# Patient Record
Sex: MALE | Race: WHITE | Employment: OTHER | URBAN - METROPOLITAN AREA
[De-identification: names, ages, dates, MRNs, and addresses within clinical notes are randomized per-mention and may not be internally consistent; named-entity substitution may affect disease eponyms.]

---

## 2022-08-29 RX ORDER — ACETAMINOPHEN 500 MG
500 TABLET ORAL
COMMUNITY

## 2022-08-29 RX ORDER — EZETIMIBE 10 MG/1
10 TABLET ORAL DAILY
COMMUNITY

## 2022-08-29 RX ORDER — GLIMEPIRIDE 1 MG/1
1 TABLET ORAL DAILY
COMMUNITY

## 2022-08-29 RX ORDER — PRAVASTATIN SODIUM 10 MG/1
10 TABLET ORAL
COMMUNITY

## 2022-08-30 ENCOUNTER — HOSPITAL ENCOUNTER (OUTPATIENT)
Age: 77
Setting detail: OUTPATIENT SURGERY
Discharge: HOME | End: 2022-08-30
Attending: SPECIALIST | Admitting: SPECIALIST
Payer: MEDICARE

## 2022-08-30 ENCOUNTER — ANESTHESIA EVENT (OUTPATIENT)
Dept: ENDOSCOPY | Age: 77
End: 2022-08-30
Payer: MEDICARE

## 2022-08-30 ENCOUNTER — ANESTHESIA (OUTPATIENT)
Dept: ENDOSCOPY | Age: 77
End: 2022-08-30
Payer: MEDICARE

## 2022-08-30 VITALS
BODY MASS INDEX: 29.72 KG/M2 | DIASTOLIC BLOOD PRESSURE: 71 MMHG | TEMPERATURE: 97.7 F | HEART RATE: 94 BPM | SYSTOLIC BLOOD PRESSURE: 104 MMHG | HEIGHT: 72 IN | OXYGEN SATURATION: 96 % | WEIGHT: 219.4 LBS | RESPIRATION RATE: 16 BRPM

## 2022-08-30 PROCEDURE — 74011250636 HC RX REV CODE- 250/636: Performed by: SPECIALIST

## 2022-08-30 PROCEDURE — 74011000250 HC RX REV CODE- 250: Performed by: ANESTHESIOLOGY

## 2022-08-30 PROCEDURE — 76040000019: Performed by: SPECIALIST

## 2022-08-30 PROCEDURE — 74011250636 HC RX REV CODE- 250/636: Performed by: ANESTHESIOLOGY

## 2022-08-30 PROCEDURE — 77030013992 HC SNR POLYP ENDOSC BSC -B: Performed by: SPECIALIST

## 2022-08-30 PROCEDURE — 76060000031 HC ANESTHESIA FIRST 0.5 HR: Performed by: SPECIALIST

## 2022-08-30 PROCEDURE — 2709999900 HC NON-CHARGEABLE SUPPLY: Performed by: SPECIALIST

## 2022-08-30 PROCEDURE — 88305 TISSUE EXAM BY PATHOLOGIST: CPT

## 2022-08-30 RX ORDER — SODIUM CHLORIDE 9 MG/ML
50 INJECTION, SOLUTION INTRAVENOUS CONTINUOUS
Status: DISCONTINUED | OUTPATIENT
Start: 2022-08-30 | End: 2022-08-30 | Stop reason: HOSPADM

## 2022-08-30 RX ORDER — LIDOCAINE HYDROCHLORIDE 20 MG/ML
INJECTION, SOLUTION EPIDURAL; INFILTRATION; INTRACAUDAL; PERINEURAL AS NEEDED
Status: DISCONTINUED | OUTPATIENT
Start: 2022-08-30 | End: 2022-08-30 | Stop reason: HOSPADM

## 2022-08-30 RX ORDER — DEXTROMETHORPHAN/PSEUDOEPHED 2.5-7.5/.8
1.2 DROPS ORAL
Status: DISCONTINUED | OUTPATIENT
Start: 2022-08-30 | End: 2022-08-30 | Stop reason: HOSPADM

## 2022-08-30 RX ORDER — SODIUM CHLORIDE 0.9 % (FLUSH) 0.9 %
5-40 SYRINGE (ML) INJECTION EVERY 8 HOURS
Status: DISCONTINUED | OUTPATIENT
Start: 2022-08-30 | End: 2022-08-30 | Stop reason: HOSPADM

## 2022-08-30 RX ORDER — PROPOFOL 10 MG/ML
INJECTION, EMULSION INTRAVENOUS AS NEEDED
Status: DISCONTINUED | OUTPATIENT
Start: 2022-08-30 | End: 2022-08-30 | Stop reason: HOSPADM

## 2022-08-30 RX ORDER — SODIUM CHLORIDE 0.9 % (FLUSH) 0.9 %
5-40 SYRINGE (ML) INJECTION AS NEEDED
Status: DISCONTINUED | OUTPATIENT
Start: 2022-08-30 | End: 2022-08-30 | Stop reason: HOSPADM

## 2022-08-30 RX ADMIN — SODIUM CHLORIDE 50 ML/HR: 9 INJECTION, SOLUTION INTRAVENOUS at 08:44

## 2022-08-30 RX ADMIN — LIDOCAINE HYDROCHLORIDE 40 MG: 20 INJECTION, SOLUTION EPIDURAL; INFILTRATION; INTRACAUDAL; PERINEURAL at 08:56

## 2022-08-30 RX ADMIN — PROPOFOL 130 MG: 10 INJECTION, EMULSION INTRAVENOUS at 09:13

## 2022-08-30 NOTE — ANESTHESIA POSTPROCEDURE EVALUATION
Procedure(s):  COLONOSCOPY  ENDOSCOPIC POLYPECTOMY. total IV anesthesia    Anesthesia Post Evaluation        Patient location during evaluation: PACU  Note status: Adequate. Level of consciousness: responsive to verbal stimuli and sleepy but conscious  Pain management: satisfactory to patient  Airway patency: patent  Anesthetic complications: no  Cardiovascular status: acceptable  Respiratory status: acceptable  Hydration status: acceptable  Comments: +Post-Anesthesia Evaluation and Assessment    Patient: Darrick Wright MRN: [id number]  SSN: [id number]   YOB: 1945  Age: 77 y.o. Sex: male      Cardiovascular Function/Vital Signs    /71   Pulse 94   Temp 36.5 °C (97.7 °F)   Resp 16   Ht 6' (1.829 m)   Wt 99.5 kg (219 lb 6.4 oz)   SpO2 96%   BMI 29.76 kg/m²     Patient is status post Procedure(s):  COLONOSCOPY  ENDOSCOPIC POLYPECTOMY. Nausea/Vomiting: Controlled. Postoperative hydration reviewed and adequate. Pain:  Pain Scale 1: Numeric (0 - 10) (08/30/22 0935)  Pain Intensity 1: 0 (08/30/22 0935)   Managed. Neurological Status: At baseline. Mental Status and Level of Consciousness: Arousable. Pulmonary Status:   O2 Device: None (Room air) (08/30/22 0935)   Adequate oxygenation and airway patent. Complications related to anesthesia: None    Post-anesthesia assessment completed. No concerns. Signed By: Jody Barreto MD    8/30/2022  Post anesthesia nausea and vomiting:  controlled      INITIAL Post-op Vital signs:   Vitals Value Taken Time   /78 08/30/22 0939   Temp 36.5 °C (97.7 °F) 08/30/22 0924   Pulse 88 08/30/22 0940   Resp 13 08/30/22 0940   SpO2 98 % 08/30/22 0939   Vitals shown include unvalidated device data.

## 2022-08-30 NOTE — DISCHARGE INSTRUCTIONS
Terry Miranda  [id number]  1945    COLON DISCHARGE INSTRUCTIONS  Discomfort:  Redness at IV site- apply warm compress to area; if redness or soreness persist- contact your physician  There may be a slight amount of blood passed from the rectum  Gaseous discomfort- walking, belching will help relieve any discomfort  You may not operate a vehicle for 12 hours  You may not engage in an occupation involving machinery or appliances for rest of today  You may not drink alcoholic beverages for at least 12 hours  Avoid making any critical decisions for at least 24 hour  DIET:   Regular diet. - however -  remember your colon is empty and a heavy meal will produce gas. Avoid these foods:  vegetables, fried / greasy foods, carbonated drinks for today. MEDICATIONS:        Regarding Aspirin or Nonsteroidal medications, please see below. ACTIVITY:  You may resume your normal daily activities it is recommended that you spend the remainder of the day resting -  avoid any strenuous activity. CALL M.D. ANY SIGN OF:  Increasing pain, nausea, vomiting  Abdominal distension (swelling)  New increased bleeding (oral or rectal)  Fever (chills)  Pain in chest area  Bloody discharge from nose or mouth  Shortness of breath  Tylenol as needed for pain. Follow-up Instructions:   Call Dr. Justin Victoria for results of procedure / biopsy in 4-5 days at telephone #  [phone]              Repeat Colonoscopy timing depends on results of the polyp results.

## 2022-08-30 NOTE — PERIOP NOTES
Endoscopy Case End Note:    0788:  Procedure scope was pre-cleaned, per protocol, at bedside by Kahlil Chakraborty. 3236:  Report received from anesthesia - Dr. Brenda Tellez. See anesthesia flowsheet for intra-procedure vital signs and events. 1769:  Glasses returned to patient.

## 2022-08-30 NOTE — H&P
Gastroenterology Outpatient History and Physical    Patient: Mir Mckeon    Physician: Brad Shaw MD    Vital Signs: Blood pressure (!) 146/75, pulse 92, resp. rate 15, height 6' (1.829 m), weight 99.5 kg (219 lb 6.4 oz), SpO2 96 %. Allergies: No Known Allergies    Chief Complaint: CRC screening    History of Present Illness: above    Justification for Procedure: above    History:  Past Medical History:   Diagnosis Date    Arthritis     Cancer ([address]     skin cancer on scalp, ears & nose    Cataract     Chronic pain     low back    Diabetes (HCC)     Diverticulosis     GERD (gastroesophageal reflux disease)     High cholesterol     PUD (peptic ulcer disease)     RBBB (right bundle branch block) 04/13/2011    Sleep apnea     uses CPAP      Past Surgical History:   Procedure Laterality Date    COLONOSCOPY N/A 09/21/2016    COLONOSCOPY performed by Brad Shaw MD at [facility] ENDOSCOPY    HX CATARACT REMOVAL Bilateral     HX KNEE ARTHROSCOPY Left 01/01/2003    HX KNEE ARTHROSCOPY Left 01/01/2009    ACL repair    HX LUMBAR FUSION  1990~    HX ORTHOPAEDIC Right 01/01/1998    tennis elbow repair    HX PARTIAL COLECTOMY  04/2011    5\" of sigmoid      Social History     Socioeconomic History    Marital status:    Tobacco Use    Smoking status: Former     Types: Pipe    Smokeless tobacco: Never   Substance and Sexual Activity    Alcohol use: Not Currently    Drug use: No      Family History   Problem Relation Age of Onset    Heart Failure Mother     Stroke Father     Diabetes Sister     Glaucoma Sister     Alzheimer's Disease Brother        Medications:   Prior to Admission medications    Medication Sig Start Date End Date Taking? Authorizing Provider   acetaminophen (TYLENOL) 500 mg tablet Take 500 mg by mouth every six (6) hours as needed for Pain. Yes Provider, Historical   METHYLCELLULOSE, LAXATIVE, PO Take  by mouth daily.    Yes Provider, Historical   pravastatin (PRAVACHOL) 10 mg tablet Take 10 mg by mouth nightly. Yes Provider, Historical   glimepiride (AMARYL) 1 mg tablet Take 1 mg by mouth daily. Yes Provider, Historical   ezetimibe (ZETIA) 10 mg tablet Take 10 mg by mouth daily. Yes Provider, Historical   Lactobacillus acidophilus (PROBIOTIC PO) Take  by mouth daily. Yes Provider, Historical   metFORMIN (GLUCOPHAGE) 500 mg tablet Take 500 mg by mouth two (2) times daily (with meals). Yes Provider, Historical   loratadine (CLARITIN) 10 mg tablet Take 10 mg by mouth. Yes Provider, Historical   montelukast (SINGULAIR) 10 mg tablet Take 10 mg by mouth nightly. Yes Provider, Historical   omeprazole (PRILOSEC) 20 mg capsule Take 20 mg by mouth daily. Yes Provider, Historical   fenofibrate (TRICOR) 160 mg tablet Take 160 mg by mouth daily. Indications: HYPERTRIGLYCERIDEMIA   Yes Provider, Historical   multivitamin (ONE A DAY) tablet Take 1 Tablet by mouth daily. Yes Provider, Historical   cholecalciferol (VITAMIN D3) 25 mcg (1,000 unit) cap Take 1 Cap by mouth daily. Yes Provider, Historical   co-enzyme Q-10 (CO Q-10) 100 mg capsule Take 100 mg by mouth daily. Yes Provider, Historical   glutamine 500 mg tab Take 1 Tab by mouth daily. Yes Provider, Historical   beta carotene 25,000 unit capsule Take 25,000 Units by mouth every other day. Yes Provider, Historical   aspirin 81 mg tablet Take 81 mg by mouth daily. 4/13/11  Yes Provider, Historical   azelastine (OPTIVAR) 0.05 % ophthalmic solution Administer 1 Drop to both eyes as needed. Use in affected eye(s)    Provider, Historical   lutein 20 mg Cap Take 1 Cap by mouth daily. Patient not taking: Reported on 8/30/2022    Provider, Historical       Physical Exam:   General: alert, no distress   HEENT: Head: Normocephalic, no lesions, without obvious abnormality.    Heart: regular rate and rhythm, S1, S2 normal, no murmur, click, rub or gallop   Lungs: chest clear, no wheezing, rales, normal symmetric air entry   Abdominal: soft, NT/ND+ BS   Neurological: Grossly normal   Extremities: extremities normal, atraumatic, no cyanosis or edema     Findings/Diagnosis: CRC screening    Plan of Care/Planned Procedure: Colonoscopy

## 2022-08-30 NOTE — ANESTHESIA PREPROCEDURE EVALUATION
Anesthetic History   No history of anesthetic complications            Review of Systems / Medical History  Patient summary reviewed, nursing notes reviewed and pertinent labs reviewed    Pulmonary        Sleep apnea: CPAP           Neuro/Psych   Within defined limits           Cardiovascular            Dysrhythmias       Exercise tolerance: >4 METS  Comments: RBBB   GI/Hepatic/Renal     GERD: well controlled      PUD    Comments: H/O colon polyps  H/O diverticulosis s/p sigmoid colectomy Endo/Other    Diabetes    Arthritis    Comments: H/O SCC scalp s/p excision Other Findings              Physical Exam    Airway  Mallampati: I  TM Distance: > 6 cm  Neck ROM: normal range of motion   Mouth opening: Normal     Cardiovascular  Regular rate and rhythm,  S1 and S2 normal,  no murmur, click, rub, or gallop             Dental  No notable dental hx       Pulmonary  Breath sounds clear to auscultation               Abdominal  GI exam deferred       Other Findings            Anesthetic Plan    ASA: 3  Anesthesia type: general and total IV anesthesia          Induction: Intravenous  Anesthetic plan and risks discussed with: Patient      Propofol MAC

## 2022-08-30 NOTE — PROCEDURES
Colonoscopy Procedure Note    Indications:   Screening colonoscopy    Referring Physician: Siddharth Bardales MD  Anesthesia/Sedation: MAC anesthesia Propofol  Endoscopist:  Dr. Josee Arrieta    Procedure in Detail:  Informed consent was obtained for the procedure, including sedation. Risks of perforation, hemorrhage, adverse drug reaction, and aspiration were discussed. The patient was placed in the left lateral decubitus position. Based on the pre-procedure assessment, including review of the patient's medical history, medications, allergies, and review of systems, he had been deemed to be an appropriate candidate for moderate sedation; he was therefore sedated with the medications listed above. The patient was monitored continuously with ECG tracing, pulse oximetry, blood pressure monitoring, and direct observations. A rectal examination was performed. The [id number] was inserted into the rectum and advanced under direct vision to the terminal ileum. The quality of the colonic preparation was adequate. A careful inspection was made as the colonoscope was withdrawn, including a retroflexed view of the rectum; findings and interventions are described below. Appropriate photodocumentation was obtained. Findings:    Scope advanced to the cecum and terminal ileum. Preparation was adequate. There was a sessile 5 mm polyp in the transverse colon s/p cold snare removal   Normal anastomosis in sigmoid colon. Therapies:  see above    Specimen: Specimens were collected as described above and sent to pathology. Complications: None were encountered during the procedure. EBL: < 10 ml. Recommendations:   -Repeat colonoscopy interval to be determined based on pathology results.     Signed By: Ximena Pennington MD                        August 30, 2022

## 2022-08-30 NOTE — PROGRESS NOTES
Patient Education on Sedation / Analgesia Administered for Procedure      For 24 hours after general anesthesia or intravenous analgesia / sedation:  Have someone responsible help you with your care  Limit your activities  Do not drive and operate hazardous machinery  Do not make important personal, legal or business decisions  Do not drink alcoholic beverages  If you have not urinated within 8 hours after discharge, please contact your physician  Resume your medications unless otherwise instructed    For 24 hours after general anesthesia or intravenous analgesia / sedation  you may experience:  Drowsiness, dizziness, sleepiness, or confusion  Difficulty remembering or delayed reaction times  Difficulty with your balance, especially while walking, move slowly and carefully, do not make sudden position changes  Difficulty focusing or blurred vision    You may not be aware of slight changes in your behavior and/or your reaction time because of the medication used during and after your procedure.     Report the following to your physician:  Excessive pain, swelling, redness or odor of or around the surgical area  Temperature over 100.5  Nausea and vomiting lasting longer than 4 hours or if unable to take medications  Any signs of decreased circulation or nerve impairment to extremity: change in color, persistent numbness, tingling, coldness or increase pain  Any questions or concerns    IF YOU REPORT TO AN EMERGENCY ROOM, DOCTOR'S OFFICE OR HOSPITAL WITHIN 24 HOURS AFTER YOUR PROCEDURE, BRING THIS SHEET AND YOUR AFTER VISIT SUMMARY WITH YOU AND GIVE IT TO THE PHYSICIAN OR NURSE ATTENDING YOU.

## 2022-08-30 NOTE — ROUTINE PROCESS
Donta Bowels  1945  [id number]    Situation:  Verbal report received from: Jc Wade  Procedure: Procedure(s):  COLONOSCOPY  ENDOSCOPIC POLYPECTOMY    Background:    Preoperative diagnosis: SCREENING  Postoperative diagnosis: Colon Polyp, Normal Anastomosis    :  Dr. Ramachandran Spine  Assistant(s): Endoscopy Technician-1: Ela Moyer  Endoscopy RN-1: Kaleb Cárdenas RN    Specimens:   ID Type Source Tests Collected by Time Destination   1 : Transverse Colon Polyp Preservative Colon, Transverse  Meghan Myles MD 8/30/2022 8745 Pathology     H. Pylori  no    Assessment:  Intra-procedure medications   Anesthesia gave intra-procedure sedation and medications, see anesthesia flow sheet yes    Intravenous fluids: NS@ KVO     Vital signs stable     Abdominal assessment: round and soft     Recommendation:  Discharge patient per MD order.   Family or Friend   Permission to share finding with family or friend yes

## 2022-12-20 ENCOUNTER — APPOINTMENT (OUTPATIENT)
Dept: ULTRASOUND IMAGING | Age: 77
End: 2022-12-20
Attending: EMERGENCY MEDICINE
Payer: MEDICARE

## 2022-12-20 ENCOUNTER — HOSPITAL ENCOUNTER (EMERGENCY)
Age: 77
Discharge: HOME | End: 2022-12-20
Attending: EMERGENCY MEDICINE
Payer: MEDICARE

## 2022-12-20 VITALS
DIASTOLIC BLOOD PRESSURE: 78 MMHG | TEMPERATURE: 98.2 F | WEIGHT: 229.94 LBS | RESPIRATION RATE: 14 BRPM | HEIGHT: 73 IN | BODY MASS INDEX: 30.47 KG/M2 | OXYGEN SATURATION: 98 % | HEART RATE: 91 BPM | SYSTOLIC BLOOD PRESSURE: 155 MMHG

## 2022-12-20 DIAGNOSIS — S86.112A GASTROCNEMIUS MUSCLE STRAIN, LEFT, INITIAL ENCOUNTER: Primary | ICD-10-CM

## 2022-12-20 PROCEDURE — 93971 EXTREMITY STUDY: CPT

## 2022-12-20 PROCEDURE — 99284 EMERGENCY DEPT VISIT MOD MDM: CPT

## 2022-12-20 RX ORDER — NAPROXEN 500 MG/1
500 TABLET ORAL 2 TIMES DAILY WITH MEALS
Qty: 20 TABLET | Refills: 0 | Status: SHIPPED | OUTPATIENT
Start: 2022-12-20 | End: 2022-12-30

## 2022-12-20 NOTE — ED PROVIDER NOTES
EMERGENCY DEPARTMENT HISTORY AND PHYSICAL EXAM      Date: 12/20/2022  Patient Name: Maycol Shaw    History of Presenting Illness     Chief Complaint   Patient presents with    Leg Swelling     Left lower leg pain swelling onset last Thursday, started out as a grace horse . Having some swelling on inner left calf. Not on a blood thinner except 81 mg aspirin       History Provided By: Patient    HPI: Maycol Shaw, 69 y.o. male with a past medical history significant for medical problems as stated below presents to the ED with cc of presenting with persistent, atraumatic left lower leg pain and swelling over the last 1 week. Pain is isolated in the superior aspect of the posterior calf. It is it is slightly worse with walking and better at rest.  Patient called primary care doctor who told him to come to the ER to rule out a blood clot. There is no chest pain, trouble breathing, or any systemic symptoms. There are no associated symptoms. No other exacerbating or ameliorating factors. PCP: Mc Tineo MD    No current facility-administered medications on file prior to encounter. Current Outpatient Medications on File Prior to Encounter   Medication Sig Dispense Refill    acetaminophen (TYLENOL) 500 mg tablet Take 500 mg by mouth every six (6) hours as needed for Pain. METHYLCELLULOSE, LAXATIVE, PO Take  by mouth daily. pravastatin (PRAVACHOL) 10 mg tablet Take 10 mg by mouth nightly. glimepiride (AMARYL) 1 mg tablet Take 1 mg by mouth daily. ezetimibe (ZETIA) 10 mg tablet Take 10 mg by mouth daily. Lactobacillus acidophilus (PROBIOTIC PO) Take  by mouth daily. metFORMIN (GLUCOPHAGE) 500 mg tablet Take 500 mg by mouth two (2) times daily (with meals). azelastine (OPTIVAR) 0.05 % ophthalmic solution Administer 1 Drop to both eyes as needed.  Use in affected eye(s)      loratadine (CLARITIN) 10 mg tablet Take 10 mg by mouth.      montelukast (SINGULAIR) 10 mg tablet Take 10 mg by mouth nightly. omeprazole (PRILOSEC) 20 mg capsule Take 20 mg by mouth daily. fenofibrate (TRICOR) 160 mg tablet Take 160 mg by mouth daily. Indications: HYPERTRIGLYCERIDEMIA      multivitamin (ONE A DAY) tablet Take 1 Tablet by mouth daily. cholecalciferol (VITAMIN D3) 25 mcg (1,000 unit) cap Take 1 Cap by mouth daily. co-enzyme Q-10 (CO Q-10) 100 mg capsule Take 100 mg by mouth daily. glutamine 500 mg tab Take 1 Tab by mouth daily. lutein 20 mg Cap Take 1 Cap by mouth daily. (Patient not taking: Reported on 8/30/2022)      beta carotene 25,000 unit capsule Take 25,000 Units by mouth every other day. aspirin 81 mg tablet Take 81 mg by mouth daily.          Past History     Past Medical History:  Past Medical History:   Diagnosis Date    Arthritis     Cancer ([facility] [address]     skin cancer on scalp, ears & nose    Cataract     Chronic pain     low back    Diabetes (HCC)     Diverticulosis     GERD (gastroesophageal reflux disease)     High cholesterol     PUD (peptic ulcer disease)     RBBB (right bundle branch block) 04/13/2011    Sleep apnea     uses CPAP       Past Surgical History:  Past Surgical History:   Procedure Laterality Date    COLONOSCOPY N/A 09/21/2016    COLONOSCOPY performed by Jere Harris MD at [facility] ENDOSCOPY    COLONOSCOPY N/A 8/30/2022    COLONOSCOPY performed by Marva Grace MD at [facility] ENDOSCOPY    HX CATARACT REMOVAL Bilateral     HX KNEE ARTHROSCOPY Left 01/01/2003    HX KNEE ARTHROSCOPY Left 01/01/2009    ACL repair    HX LUMBAR FUSION  1990~    HX ORTHOPAEDIC Right 01/01/1998    tennis elbow repair    HX PARTIAL COLECTOMY  04/2011    5\" of sigmoid       Family History:  Family History   Problem Relation Age of Onset    Heart Failure Mother     Stroke Father     Diabetes Sister     Glaucoma Sister     Alzheimer's Disease Brother        Social History:  Social History     Tobacco Use    Smoking status: Former     Types: Pipe    Smokeless tobacco: Never   Substance Use Topics    Alcohol use: Not Currently    Drug use: No       Allergies:  No Known Allergies      Review of Systems   Review of Systems   Constitutional:  Negative for chills, diaphoresis, fatigue and fever. HENT:  Negative for ear pain and sore throat. Eyes:  Negative for pain and redness. Respiratory:  Negative for cough and shortness of breath. Cardiovascular:  Negative for chest pain and leg swelling. Gastrointestinal:  Negative for abdominal pain, diarrhea, nausea and vomiting. Endocrine: Negative for cold intolerance and heat intolerance. Genitourinary:  Negative for flank pain and hematuria. Musculoskeletal:  Positive for myalgias. Negative for back pain and neck stiffness. Skin:  Negative for rash and wound. Neurological:  Negative for dizziness, syncope and headaches. All other systems reviewed and are negative. Physical Exam   Physical Exam  Vitals and nursing note reviewed. Constitutional:       General: He is not in acute distress. Appearance: He is well-developed. He is not ill-appearing. HENT:      Head: Normocephalic and atraumatic. Mouth/Throat:      Pharynx: No oropharyngeal exudate. Eyes:      Conjunctiva/sclera: Conjunctivae normal.      Pupils: Pupils are equal, round, and reactive to light. Cardiovascular:      Rate and Rhythm: Normal rate and regular rhythm. Heart sounds: No murmur heard. Pulmonary:      Effort: Pulmonary effort is normal. No respiratory distress. Breath sounds: Normal breath sounds. No wheezing. Abdominal:      General: Bowel sounds are normal. There is no distension. Palpations: Abdomen is soft. Tenderness: There is no abdominal tenderness. Musculoskeletal:         General: No deformity. Normal range of motion. Cervical back: Normal range of motion. Legs:       Comments: Mild tenderness to palpation in the proximal medial calf.   There is trace pitting edema to the left lower extremity. Good distal DP and TP pulses, good cap refill, normal sensation and motor function throughout the left lower extremity. Compartments are soft. Skin:     General: Skin is warm and dry. Findings: No rash. Neurological:      Mental Status: He is alert and oriented to person, place, and time. Cranial Nerves: No cranial nerve deficit. Sensory: No sensory deficit. Motor: No weakness. Coordination: Coordination normal.      Gait: Gait normal.   Psychiatric:         Behavior: Behavior normal.       Diagnostic Study Results     Labs -   No results found for this or any previous visit (from the past 24 hour(s)). Radiologic Studies -   DUPLEX LOWER EXT VENOUS LEFT   Final Result        CT Results  (Last 48 hours)      None          CXR Results  (Last 48 hours)      None              Medical Decision Making   I am the first provider for this patient. I reviewed the vital signs, available nursing notes, past medical history, past surgical history, family history and social history. Vital Signs-Reviewed the patient's vital signs. Patient Vitals for the past 12 hrs:   Temp Pulse Resp BP SpO2   12/20/22 0948 98.2 °F (36.8 °C) 91 14 (!) 155/78 98 %       Records Reviewed: Nursing records and medical records reviewed    MDM:  DVT versus deep space infection of the leg versus muscle tear versus muscle strain versus acute arterial occlusion    Provider Notes (Medical Decision Making):   Patient is a 78-year-old male presenting with pain and swelling to the left calf that is atraumatic. Considered DVT but normal duplex ultrasound. Considered deep space infection but no fever, systemic symptoms, or any erythema or redness. Based on ultrasound results suspect muscle injury, strain. Patient does regular exercises with the lower extremity for his arthritis that may have caused this. Port of care and return precautions. ED Course:   Initial assessment performed.  The patients presenting problems have been discussed, and they are in agreement with the care plan formulated and outlined with them. I have encouraged them to ask questions as they arise throughout their visit. Critical Care:  None      Disposition:  11:03 AM  Tanmay Conway  results have been reviewed with him. He has been counseled regarding his diagnosis. He verbally conveys understanding and agreement of the signs, symptoms, diagnosis, treatment and prognosis and additionally agrees to follow up as recommended with Dr. La Silva MD in 24 - 48 hours. He also agrees with the care-plan and conveys that all of his questions have been answered. I have also put together some discharge instructions for him that include: 1) educational information regarding their diagnosis, 2) how to care for their diagnosis at home, as well a 3) list of reasons why they would want to return to the ED prior to their follow-up appointment, should their condition change. DISCHARGE PLAN:  1. Current Discharge Medication List        START taking these medications    Details   naproxen (Naprosyn) 500 mg tablet Take 1 Tablet by mouth two (2) times daily (with meals) for 10 days. Qty: 20 Tablet, Refills: 0  Start date: 12/20/2022, End date: 12/30/2022           2. Follow-up Information       Follow up With Specialties Details Why Contact Info    La Silva MD Family Medicine In 3 days For a follow-up evaluation. [address] [place] [address]  [phone]      [facility] EMERGENCY DEPT Emergency Medicine In 2 days If symptoms worsen to include any worsening pain, swelling, redness or any new concerns. [address] [place] [address]  [phone]          3. Return to ED if worse     Diagnosis     Clinical Impression:   1.  Gastrocnemius muscle strain, left, initial encounter        Attestations:    Bailey Landry MD    Please note that this dictation was completed with Hermila the computer voice recognition software. Quite often unanticipated grammatical, syntax, homophones, and other interpretive errors are inadvertently transcribed by the computer software. Please disregard these errors. Please excuse any errors that have escaped final proofreading. Thank you.

## 2022-12-20 NOTE — DISCHARGE INSTRUCTIONS
Please apply ice to the area and use compressive wrap to the area. You can use Naprosyn as needed for pain relief. You do not have a blood clot in your leg. This should improve with supportive care over the next week. Follow-up with your primary care doctor. It was a pleasure taking care of you at [facility] Emergency Department today. We know that when you come to [facility], you are entrusting us with your health, comfort, and safety. Our physicians and nurses honor that trust, and we truly appreciate the opportunity to care for you and your loved ones. We also value your feedback. If you receive a survey about your Emergency Department experience today, please fill it out. We care about our patients' feedback, and we listen to what you have to say. Thank you!

## (undated) DEVICE — 1200 GUARD II KIT W/5MM TUBE W/O VAC TUBE: Brand: GUARDIAN

## (undated) DEVICE — HYPODERMIC SAFETY NEEDLE: Brand: MAGELLAN

## (undated) DEVICE — TOWEL 4 PLY TISS 19X30 SUE WHT

## (undated) DEVICE — CATH IV AUTOGRD BC PNK 20GA 25 -- INSYTE

## (undated) DEVICE — TRAP,MUCUS SPECIMEN, 80CC: Brand: MEDLINE

## (undated) DEVICE — SYR 10ML LUER LOK 1/5ML GRAD --

## (undated) DEVICE — SYR 3ML LL TIP 1/10ML GRAD --

## (undated) DEVICE — SET ADMIN 16ML TBNG L100IN 2 Y INJ SITE IV PIGGY BK DISP

## (undated) DEVICE — STRAINER URIN CALC RNL MSH -- CONVERT TO ITEM 357634

## (undated) DEVICE — BASIN EMSIS 16OZ GRAPHITE PLAS KID SHP MOLD GRAD FOR ORAL

## (undated) DEVICE — NON-REM POLYHESIVE PATIENT RETURN ELECTRODE: Brand: VALLEYLAB

## (undated) DEVICE — Device

## (undated) DEVICE — NEONATAL-ADULT SPO2 SENSOR: Brand: NELLCOR

## (undated) DEVICE — CONTAINER SPEC 20 ML LID NEUT BUFF FORMALIN 10 % POLYPR STS

## (undated) DEVICE — Z DISCONTINUED PER MEDLINE LINE GAS SAMPLING O2/CO2 LNG AD 13 FT NSL W/ TBNG FILTERLINE

## (undated) DEVICE — ELECTRODE,RADIOTRANSLUCENT,FOAM,5PK: Brand: MEDLINE

## (undated) DEVICE — SNARE ENDOSCP M L240CM W27MM SHTH DIA2.4MM CHN 2.8MM OVL

## (undated) DEVICE — SOLIDIFIER FLD 2OZ 1500CC N DISINF IN BTL DISP SAFESORB